# Patient Record
Sex: MALE | Race: WHITE | URBAN - METROPOLITAN AREA
[De-identification: names, ages, dates, MRNs, and addresses within clinical notes are randomized per-mention and may not be internally consistent; named-entity substitution may affect disease eponyms.]

---

## 2024-06-18 ENCOUNTER — APPOINTMENT (OUTPATIENT)
Dept: PRIMARY CARE | Facility: CLINIC | Age: 44
End: 2024-06-18

## 2024-07-02 ENCOUNTER — APPOINTMENT (OUTPATIENT)
Dept: PRIMARY CARE | Facility: CLINIC | Age: 44
End: 2024-07-02

## 2024-07-02 VITALS
DIASTOLIC BLOOD PRESSURE: 88 MMHG | TEMPERATURE: 98 F | OXYGEN SATURATION: 96 % | HEART RATE: 101 BPM | BODY MASS INDEX: 32.41 KG/M2 | WEIGHT: 218.8 LBS | SYSTOLIC BLOOD PRESSURE: 126 MMHG | HEIGHT: 69 IN | RESPIRATION RATE: 18 BRPM

## 2024-07-02 DIAGNOSIS — R73.01 IFG (IMPAIRED FASTING GLUCOSE): ICD-10-CM

## 2024-07-02 DIAGNOSIS — Z13.29 THYROID DISORDER SCREEN: ICD-10-CM

## 2024-07-02 DIAGNOSIS — E66.9 CLASS 1 OBESITY WITHOUT SERIOUS COMORBIDITY WITH BODY MASS INDEX (BMI) OF 32.0 TO 32.9 IN ADULT, UNSPECIFIED OBESITY TYPE: ICD-10-CM

## 2024-07-02 DIAGNOSIS — E78.00 ELEVATED LDL CHOLESTEROL LEVEL: Primary | ICD-10-CM

## 2024-07-02 DIAGNOSIS — H20.9 UVEITIS: ICD-10-CM

## 2024-07-02 DIAGNOSIS — M45.6 ANKYLOSING SPONDYLITIS OF LUMBAR REGION (MULTI): ICD-10-CM

## 2024-07-02 DIAGNOSIS — Z00.00 ROUTINE GENERAL MEDICAL EXAMINATION AT A HEALTH CARE FACILITY: ICD-10-CM

## 2024-07-02 DIAGNOSIS — G47.33 OBSTRUCTIVE SLEEP APNEA: ICD-10-CM

## 2024-07-02 DIAGNOSIS — Z12.11 COLON CANCER SCREENING: ICD-10-CM

## 2024-07-02 PROBLEM — M51.36 LUMBAR DEGENERATIVE DISC DISEASE: Status: ACTIVE | Noted: 2017-07-18

## 2024-07-02 PROBLEM — M51.369 LUMBAR DEGENERATIVE DISC DISEASE: Status: ACTIVE | Noted: 2017-07-18

## 2024-07-02 PROBLEM — E66.811 CLASS 1 OBESITY WITHOUT SERIOUS COMORBIDITY WITH BODY MASS INDEX (BMI) OF 32.0 TO 32.9 IN ADULT: Status: ACTIVE | Noted: 2024-07-02

## 2024-07-02 PROBLEM — F29 PSYCHOSIS (MULTI): Status: ACTIVE | Noted: 2024-03-16

## 2024-07-02 PROBLEM — Z15.89 HLA B27 (HLA B27 POSITIVE): Status: ACTIVE | Noted: 2017-07-18

## 2024-07-02 PROCEDURE — 99386 PREV VISIT NEW AGE 40-64: CPT | Performed by: NURSE PRACTITIONER

## 2024-07-02 PROCEDURE — 3008F BODY MASS INDEX DOCD: CPT | Performed by: NURSE PRACTITIONER

## 2024-07-02 RX ORDER — OLANZAPINE 10 MG/1
1 TABLET ORAL
COMMUNITY
Start: 2024-06-10

## 2024-07-02 RX ORDER — ADALIMUMAB 40MG/0.4ML
40 KIT SUBCUTANEOUS
COMMUNITY

## 2024-07-02 ASSESSMENT — ENCOUNTER SYMPTOMS
DIZZINESS: 0
SHORTNESS OF BREATH: 0
ACTIVITY CHANGE: 0
CHILLS: 0
CONFUSION: 0
APNEA: 0
WEAKNESS: 0
COUGH: 0
RESPIRATORY NEGATIVE: 1
CONSTITUTIONAL NEGATIVE: 1
ABDOMINAL PAIN: 0
HEADACHES: 0
VOMITING: 0
FEVER: 0
NAUSEA: 0
FATIGUE: 0
PALPITATIONS: 0
NERVOUS/ANXIOUS: 0

## 2024-07-02 ASSESSMENT — PATIENT HEALTH QUESTIONNAIRE - PHQ9
1. LITTLE INTEREST OR PLEASURE IN DOING THINGS: NOT AT ALL
SUM OF ALL RESPONSES TO PHQ9 QUESTIONS 1 AND 2: 0
2. FEELING DOWN, DEPRESSED OR HOPELESS: NOT AT ALL

## 2024-07-02 ASSESSMENT — ANXIETY QUESTIONNAIRES
IF YOU CHECKED OFF ANY PROBLEMS ON THIS QUESTIONNAIRE, HOW DIFFICULT HAVE THESE PROBLEMS MADE IT FOR YOU TO DO YOUR WORK, TAKE CARE OF THINGS AT HOME, OR GET ALONG WITH OTHER PEOPLE: NOT DIFFICULT AT ALL
GAD7 TOTAL SCORE: 1
7. FEELING AFRAID AS IF SOMETHING AWFUL MIGHT HAPPEN: NOT AT ALL
2. NOT BEING ABLE TO STOP OR CONTROL WORRYING: NOT AT ALL
6. BECOMING EASILY ANNOYED OR IRRITABLE: NOT AT ALL
4. TROUBLE RELAXING: NOT AT ALL
1. FEELING NERVOUS, ANXIOUS, OR ON EDGE: SEVERAL DAYS
3. WORRYING TOO MUCH ABOUT DIFFERENT THINGS: NOT AT ALL
5. BEING SO RESTLESS THAT IT IS HARD TO SIT STILL: NOT AT ALL

## 2024-07-02 NOTE — ASSESSMENT & PLAN NOTE
".A dietary \"pattern\" means generally being in the habit of eating certain types of foods while limiting others. Some people need to follow a particular dietary pattern because of their individual health needs; for example, if you have high blood pressure, your health care provider might recommend a diet low in sodium (salt).     If you are trying to improve your overall health and lower your risk of disease, establishing a healthy dietary pattern can help. This does not have to mean being extremely restrictive or eating only healthy choices. It is more of a lifestyle choice to incorporate certain dietary components while limiting others. You can find which approaches work for you, and that can help you maintain and build on a healthy eating pattern over time.     Some dietary patterns have been found to have specific health benefits. Others may have benefits for some people, for example, those with specific health conditions.     Mediterranean diet -- A Mediterranean diet is high in fruits, vegetables, beans, nuts, and seeds. It also includes olive oil as a source of healthy fat, and moderate amounts of fish, poultry, and dairy products, but little red meat. Studies have found that this type of diet lowers the risk of heart disease and stroke and helps control blood sugar in people with diabetes. It may also lower the risk of certain types of cancer.     DASH diet -- The Dietary Approaches to Stop Hypertension (DASH) diet requires a person to eat four to five servings of fruit, four to five servings of vegetables, and two to three servings of low-fat dairy per day. All foods must contain less than 25 percent total fat per serving. It can help lower blood pressure, especially when the person also reduces their salt intake. The DASH diet may also lower the risk of other health problems, including colorectal cancer, heart disease, and gout (in males).     Plant-based diets -- Plant-based diets focus on vegetables, " "fruits, grains, beans, and nuts and limit, or completely avoid, food from animals, such as meat and dairy. There are different types of plant-based diets, including:     ?Macrobiotic - This type of diet includes lots of vegetables, fruits, legumes, and seaweeds, as well as whole grains like brown rice. People who follow a macrobiotic diet might limit animal foods to white meat or fish once or twice a week.     ?Semi-vegetarian (sometimes called \"flexitarian\") - Some people choose to eat meat only occasionally, or avoid red meat but eat poultry and/or fish.     ?Lacto-ovovegetarian - This means a person eats milk and dairy products as well as eggs, but no meat.     ?Lactovegetarian - This means a person eats milk and dairy products, but not eggs or meat.     ?Vegan - This means avoiding all food products that come from animal sources, including eggs and milk products.     Plant-based diets may lower the risk of obesity, heart disease, high blood pressure, diabetes, and some cancers. While plant-based diets are generally healthy, as with other patterns, it's also important to limit things like processed foods, unhealthy fats, sugar, and salt. If you do not eat meat and/or other animal products, it is important to ensure you are getting enough nutrients overall. Vegans, in particular, have been found to be low in nutrients like calcium and vitamin B12.      Low-fat diet -- A low-fat diet involves limiting intake of calories from fat. This pattern may have some benefits when it comes to maintaining a lower weight, but overall it is likely no more effective than other approaches in helping people lose weight. In general, few, if any, other health benefits have been linked to low-fat diets.     When following a low-fat diet, it is important to focus on whole grains, legumes, fruits, and vegetables, with limited refined grains and sugar. Fats should be from healthy sources, such as fatty fish, nuts, olive oil, and canola " oil.      Low-cholesterol diet -- Cholesterol is typically found in foods with a lot of saturated fat, like red meat, butter, and cheese. A low-cholesterol diet focuses on limiting the amount of cholesterol in the diet. Since high-cholesterol foods also generally are high in unhealthy fats (such as saturated fat), limiting the cholesterol in your diet can also help reduce the amount of unhealthy fats you consume.

## 2024-07-02 NOTE — PROGRESS NOTES
Subjective   Patient ID: Tye Talamantes is a 44 y.o. male who presents for New Patient Visit.    Annual Examination     45 yo male patient here to establish care. Patient with pmh of bipolar type 1, Ankylosing spondylitis, STEVIE, PTSD  Previous patient in [facility], now changing to . Needs referral to rheumatology. Referral request to ophthalmology-uveitis     No new issues   Discussed labs 3/2024 with [facility]     Current Outpatient Medications:   ·  adalimumab (Humira,CF, Pen) 40 mg/0.4 mL pen injector kit pen-injector, Inject 1 Pen (40 mg) under the skin 1 (one) time per week., Disp: , Rfl:   ·  OLANZapine (ZyPREXA) 10 mg tablet, Take 1 tablet (10 mg) by mouth early in the morning.., Disp: , Rfl:        Health Maintenance Topics with due status: Overdue       Topic Date Due    Lipid Panel Never done    Colorectal Cancer Screening Never done    MMR Vaccines Never done    Hepatitis B Vaccines Never done    DTaP/Tdap/Td Vaccines Never done    COVID-19 Vaccine 09/01/2023     Health Maintenance Topics with due status: Not Due       Topic Last Completion Date    Influenza Vaccine 10/30/2021    Diabetes Screening 03/18/2024    Yearly Adult Physical 07/02/2024    Zoster Vaccines Not Due     Health Maintenance Topics with due status: Aged Out       Topic Date Due    HIB Vaccines Aged Out    IPV Vaccines Aged Out    Hepatitis A Vaccines Aged Out    Meningococcal Vaccine Aged Out    Rotavirus Vaccines Aged Out    HPV Vaccines Aged Out    Pneumococcal Vaccine: Pediatrics (0 to 5 Years) and At-Risk Patients (6 to 64 Years) Aged Out     Health Maintenance Topics with due status: Discontinued       Topic Date Due    HIV Screening Discontinued    Hepatitis C Screening Discontinued        Review of Systems   Constitutional: Negative.  Negative for activity change, chills, fatigue and fever.   Respiratory: Negative.  Negative for apnea, cough and shortness of breath.    Cardiovascular:  Negative for chest  "pain and palpitations.   Gastrointestinal:  Negative for abdominal pain, nausea and vomiting.   Neurological:  Negative for dizziness, weakness and headaches.   Psychiatric/Behavioral:  Negative for confusion. The patient is not nervous/anxious.        Objective   /88   Pulse 101   Temp 36.7 °C (98 °F) (Temporal)   Resp 18   Ht 1.753 m (5' 9\")   Wt 99.2 kg (218 lb 12.8 oz)   SpO2 96%   BMI 32.31 kg/m²     Physical Exam  Vitals reviewed.   Constitutional:       Appearance: Normal appearance.   Cardiovascular:      Rate and Rhythm: Normal rate and regular rhythm.      Pulses: Normal pulses.      Heart sounds: Normal heart sounds.   Pulmonary:      Effort: Pulmonary effort is normal.      Breath sounds: Normal breath sounds.   Neurological:      Mental Status: He is alert and oriented to person, place, and time.   Psychiatric:         Mood and Affect: Mood normal.         Behavior: Behavior normal.         Assessment/Plan   Problem List Items Addressed This Visit             ICD-10-CM    Obstructive sleep apnea G47.33     On cpap/ does admit to being nonadherent         Ankylosing spondylitis of lumbar region (Multi) M45.6     Referral to rheumatology   Continue humira as directed          Relevant Orders    Referral to Rheumatology    CBC and Auto Differential    Comprehensive Metabolic Panel    Uveitis H20.9     Referral to ophthalmology  No redness or inflammation noted today         Relevant Orders    Referral to Ophthalmology    Routine general medical examination at a health care facility Z00.00     PE done         Class 1 obesity without serious comorbidity with body mass index (BMI) of 32.0 to 32.9 in adult E66.9, Z68.32     .A dietary \"pattern\" means generally being in the habit of eating certain types of foods while limiting others. Some people need to follow a particular dietary pattern because of their individual health needs; for example, if you have high blood pressure, your health care " provider might recommend a diet low in sodium (salt).     If you are trying to improve your overall health and lower your risk of disease, establishing a healthy dietary pattern can help. This does not have to mean being extremely restrictive or eating only healthy choices. It is more of a lifestyle choice to incorporate certain dietary components while limiting others. You can find which approaches work for you, and that can help you maintain and build on a healthy eating pattern over time.     Some dietary patterns have been found to have specific health benefits. Others may have benefits for some people, for example, those with specific health conditions.     Mediterranean diet -- A Mediterranean diet is high in fruits, vegetables, beans, nuts, and seeds. It also includes olive oil as a source of healthy fat, and moderate amounts of fish, poultry, and dairy products, but little red meat. Studies have found that this type of diet lowers the risk of heart disease and stroke and helps control blood sugar in people with diabetes. It may also lower the risk of certain types of cancer.     DASH diet -- The Dietary Approaches to Stop Hypertension (DASH) diet requires a person to eat four to five servings of fruit, four to five servings of vegetables, and two to three servings of low-fat dairy per day. All foods must contain less than 25 percent total fat per serving. It can help lower blood pressure, especially when the person also reduces their salt intake. The DASH diet may also lower the risk of other health problems, including colorectal cancer, heart disease, and gout (in males).     Plant-based diets -- Plant-based diets focus on vegetables, fruits, grains, beans, and nuts and limit, or completely avoid, food from animals, such as meat and dairy. There are different types of plant-based diets, including:     ?Macrobiotic - This type of diet includes lots of vegetables, fruits, legumes, and seaweeds, as well as  "whole grains like brown rice. People who follow a macrobiotic diet might limit animal foods to white meat or fish once or twice a week.     ?Semi-vegetarian (sometimes called \"flexitarian\") - Some people choose to eat meat only occasionally, or avoid red meat but eat poultry and/or fish.     ?Lacto-ovovegetarian - This means a person eats milk and dairy products as well as eggs, but no meat.     ?Lactovegetarian - This means a person eats milk and dairy products, but not eggs or meat.     ?Vegan - This means avoiding all food products that come from animal sources, including eggs and milk products.     Plant-based diets may lower the risk of obesity, heart disease, high blood pressure, diabetes, and some cancers. While plant-based diets are generally healthy, as with other patterns, it's also important to limit things like processed foods, unhealthy fats, sugar, and salt. If you do not eat meat and/or other animal products, it is important to ensure you are getting enough nutrients overall. Vegans, in particular, have been found to be low in nutrients like calcium and vitamin B12.      Low-fat diet -- A low-fat diet involves limiting intake of calories from fat. This pattern may have some benefits when it comes to maintaining a lower weight, but overall it is likely no more effective than other approaches in helping people lose weight. In general, few, if any, other health benefits have been linked to low-fat diets.     When following a low-fat diet, it is important to focus on whole grains, legumes, fruits, and vegetables, with limited refined grains and sugar. Fats should be from healthy sources, such as fatty fish, nuts, olive oil, and canola oil.      Low-cholesterol diet -- Cholesterol is typically found in foods with a lot of saturated fat, like red meat, butter, and cheese. A low-cholesterol diet focuses on limiting the amount of cholesterol in the diet. Since high-cholesterol foods also generally are high in " unhealthy fats (such as saturated fat), limiting the cholesterol in your diet can also help reduce the amount of unhealthy fats you consume.          Relevant Orders    CBC and Auto Differential    Comprehensive Metabolic Panel     Other Visit Diagnoses         Codes    Elevated LDL cholesterol level    -  Primary E78.00    Relevant Orders    Lipid Panel    IFG (impaired fasting glucose)     R73.01    Relevant Orders    Hemoglobin A1C    Colon cancer screening     Z12.11    Relevant Orders    Colonoscopy Screening; Average Risk Patient    Thyroid disorder screen     Z13.29    Relevant Orders    TSH with reflex to Free T4 if abnormal

## 2024-07-02 NOTE — ASSESSMENT & PLAN NOTE
PE done   Patient brought into room from [facility], triage previously completed.  Height, Weight, Medications and Pharmacy reviewed.        Patient informed that all clinical results will be available through [employer],    Patient would like communication of all other results via:    [facility]    Cell Phone:   Telephone Information:   Mobile [phone]     Okay to leave a message containing results? Yes

## 2024-08-12 ENCOUNTER — TELEPHONE (OUTPATIENT)
Dept: GASTROENTEROLOGY | Facility: CLINIC | Age: 44
End: 2024-08-12
Payer: MEDICAID

## 2024-08-12 NOTE — TELEPHONE ENCOUNTER
----- Message from Nancy FLOOD sent at 8/12/2024 10:04 AM EDT -----  Regarding: RE: Open Access  MyChart Message sent to Patient to Call the Office  ----- Message -----  From: Jessica Stringer MA  Sent: 8/8/2024   9:49 AM EDT  To: [id number]  Subject: RE: Open Access                                  Message left for patient to callback.  ----- Message -----  From: Bisi Peralta RN  Sent: 8/6/2024  12:22 PM EDT  To: Do [id number]  Subject: Open Access                                      OA

## 2024-09-10 ENCOUNTER — APPOINTMENT (OUTPATIENT)
Dept: RHEUMATOLOGY | Facility: CLINIC | Age: 44
End: 2024-09-10
Payer: MEDICAID

## 2024-11-20 ENCOUNTER — APPOINTMENT (OUTPATIENT)
Dept: OPHTHALMOLOGY | Facility: CLINIC | Age: 44
End: 2024-11-20
Payer: MEDICAID

## 2024-12-09 ENCOUNTER — APPOINTMENT (OUTPATIENT)
Dept: OPHTHALMOLOGY | Age: 44
End: 2024-12-09
Payer: COMMERCIAL

## 2025-01-11 ENCOUNTER — HOSPITAL ENCOUNTER (EMERGENCY)
Facility: HOSPITAL | Age: 45
Discharge: OTHER INSTITUTION | End: 2025-01-11
Attending: EMERGENCY MEDICINE
Payer: COMMERCIAL

## 2025-01-11 ENCOUNTER — APPOINTMENT (OUTPATIENT)
Dept: CARDIOLOGY | Facility: HOSPITAL | Age: 45
End: 2025-01-11
Payer: COMMERCIAL

## 2025-01-11 ENCOUNTER — HOSPITAL ENCOUNTER (INPATIENT)
Facility: HOSPITAL | Age: 45
End: 2025-01-11
Payer: COMMERCIAL

## 2025-01-11 VITALS
OXYGEN SATURATION: 95 % | TEMPERATURE: 97.6 F | DIASTOLIC BLOOD PRESSURE: 78 MMHG | SYSTOLIC BLOOD PRESSURE: 131 MMHG | BODY MASS INDEX: 28.57 KG/M2 | WEIGHT: 192.9 LBS | RESPIRATION RATE: 16 BRPM | HEIGHT: 69 IN | HEART RATE: 60 BPM

## 2025-01-11 DIAGNOSIS — R45.851 SUICIDAL IDEATION: Primary | ICD-10-CM

## 2025-01-11 LAB
ALBUMIN SERPL BCP-MCNC: 4.1 G/DL (ref 3.4–5)
ALP SERPL-CCNC: 65 U/L (ref 33–120)
ALT SERPL W P-5'-P-CCNC: 14 U/L (ref 10–52)
AMPHETAMINES UR QL SCN: NORMAL
ANION GAP SERPL CALC-SCNC: 11 MMOL/L (ref 10–20)
APAP SERPL-MCNC: <10 UG/ML
AST SERPL W P-5'-P-CCNC: 12 U/L (ref 9–39)
BARBITURATES UR QL SCN: NORMAL
BASOPHILS # BLD AUTO: 0.03 X10*3/UL (ref 0–0.1)
BASOPHILS NFR BLD AUTO: 0.4 %
BENZODIAZ UR QL SCN: NORMAL
BILIRUB SERPL-MCNC: 0.6 MG/DL (ref 0–1.2)
BUN SERPL-MCNC: 6 MG/DL (ref 6–23)
BZE UR QL SCN: NORMAL
CALCIUM SERPL-MCNC: 9.3 MG/DL (ref 8.6–10.3)
CANNABINOIDS UR QL SCN: NORMAL
CHLORIDE SERPL-SCNC: 107 MMOL/L (ref 98–107)
CO2 SERPL-SCNC: 26 MMOL/L (ref 21–32)
CREAT SERPL-MCNC: 0.75 MG/DL (ref 0.5–1.3)
EGFRCR SERPLBLD CKD-EPI 2021: >90 ML/MIN/1.73M*2
EOSINOPHIL # BLD AUTO: 0.11 X10*3/UL (ref 0–0.7)
EOSINOPHIL NFR BLD AUTO: 1.4 %
ERYTHROCYTE [DISTWIDTH] IN BLOOD BY AUTOMATED COUNT: 13.2 % (ref 11.5–14.5)
ETHANOL SERPL-MCNC: <10 MG/DL
FENTANYL+NORFENTANYL UR QL SCN: NORMAL
GLUCOSE SERPL-MCNC: 92 MG/DL (ref 74–99)
HCT VFR BLD AUTO: 45.5 % (ref 41–52)
HGB BLD-MCNC: 15.6 G/DL (ref 13.5–17.5)
IMM GRANULOCYTES # BLD AUTO: 0.05 X10*3/UL (ref 0–0.7)
IMM GRANULOCYTES NFR BLD AUTO: 0.6 % (ref 0–0.9)
LYMPHOCYTES # BLD AUTO: 2.71 X10*3/UL (ref 1.2–4.8)
LYMPHOCYTES NFR BLD AUTO: 33.5 %
MCH RBC QN AUTO: 30.3 PG (ref 26–34)
MCHC RBC AUTO-ENTMCNC: 34.3 G/DL (ref 32–36)
MCV RBC AUTO: 88 FL (ref 80–100)
METHADONE UR QL SCN: NORMAL
MONOCYTES # BLD AUTO: 0.55 X10*3/UL (ref 0.1–1)
MONOCYTES NFR BLD AUTO: 6.8 %
NEUTROPHILS # BLD AUTO: 4.65 X10*3/UL (ref 1.2–7.7)
NEUTROPHILS NFR BLD AUTO: 57.3 %
NRBC BLD-RTO: 0 /100 WBCS (ref 0–0)
OPIATES UR QL SCN: NORMAL
OXYCODONE+OXYMORPHONE UR QL SCN: NORMAL
PCP UR QL SCN: NORMAL
PLATELET # BLD AUTO: 301 X10*3/UL (ref 150–450)
POTASSIUM SERPL-SCNC: 3.9 MMOL/L (ref 3.5–5.3)
PROT SERPL-MCNC: 7 G/DL (ref 6.4–8.2)
RBC # BLD AUTO: 5.15 X10*6/UL (ref 4.5–5.9)
SALICYLATES SERPL-MCNC: <3 MG/DL
SODIUM SERPL-SCNC: 140 MMOL/L (ref 136–145)
WBC # BLD AUTO: 8.1 X10*3/UL (ref 4.4–11.3)

## 2025-01-11 PROCEDURE — 2500000004 HC RX 250 GENERAL PHARMACY W/ HCPCS (ALT 636 FOR OP/ED): Performed by: EMERGENCY MEDICINE

## 2025-01-11 PROCEDURE — 90715 TDAP VACCINE 7 YRS/> IM: CPT | Performed by: EMERGENCY MEDICINE

## 2025-01-11 PROCEDURE — 99285 EMERGENCY DEPT VISIT HI MDM: CPT | Mod: 25 | Performed by: EMERGENCY MEDICINE

## 2025-01-11 PROCEDURE — 80320 DRUG SCREEN QUANTALCOHOLS: CPT | Performed by: EMERGENCY MEDICINE

## 2025-01-11 PROCEDURE — 36415 COLL VENOUS BLD VENIPUNCTURE: CPT | Performed by: EMERGENCY MEDICINE

## 2025-01-11 PROCEDURE — 80307 DRUG TEST PRSMV CHEM ANLYZR: CPT | Performed by: EMERGENCY MEDICINE

## 2025-01-11 PROCEDURE — 96372 THER/PROPH/DIAG INJ SC/IM: CPT | Performed by: STUDENT IN AN ORGANIZED HEALTH CARE EDUCATION/TRAINING PROGRAM

## 2025-01-11 PROCEDURE — 90471 IMMUNIZATION ADMIN: CPT | Performed by: EMERGENCY MEDICINE

## 2025-01-11 PROCEDURE — 85025 COMPLETE CBC W/AUTO DIFF WBC: CPT | Performed by: EMERGENCY MEDICINE

## 2025-01-11 PROCEDURE — 80053 COMPREHEN METABOLIC PANEL: CPT | Performed by: EMERGENCY MEDICINE

## 2025-01-11 PROCEDURE — 2500000004 HC RX 250 GENERAL PHARMACY W/ HCPCS (ALT 636 FOR OP/ED): Mod: JZ | Performed by: STUDENT IN AN ORGANIZED HEALTH CARE EDUCATION/TRAINING PROGRAM

## 2025-01-11 PROCEDURE — 93005 ELECTROCARDIOGRAM TRACING: CPT

## 2025-01-11 RX ORDER — OLANZAPINE 10 MG/2ML
10 INJECTION, POWDER, FOR SOLUTION INTRAMUSCULAR ONCE
Status: COMPLETED | OUTPATIENT
Start: 2025-01-11 | End: 2025-01-11

## 2025-01-11 RX ADMIN — TETANUS TOXOID, REDUCED DIPHTHERIA TOXOID AND ACELLULAR PERTUSSIS VACCINE, ADSORBED 0.5 ML: 5; 2.5; 8; 8; 2.5 SUSPENSION INTRAMUSCULAR at 04:23

## 2025-01-11 RX ADMIN — OLANZAPINE 10 MG: 10 INJECTION, POWDER, FOR SOLUTION INTRAMUSCULAR at 09:17

## 2025-01-11 SDOH — HEALTH STABILITY: MENTAL HEALTH: FOR HIGH RISK PATIENTS: 1:1 PATIENT OBSERVER AT ALL TIMES

## 2025-01-11 SDOH — HEALTH STABILITY: MENTAL HEALTH: DELUSIONS: OTHER (COMMENT)

## 2025-01-11 SDOH — HEALTH STABILITY: MENTAL HEALTH: BEHAVIORS/MOOD: CALM;COOPERATIVE

## 2025-01-11 SDOH — HEALTH STABILITY: MENTAL HEALTH: ACTIVE SUICIDAL IDEATION WITH SOME INTENT TO ACT, WITHOUT SPECIFIC PLAN (PAST 1 MONTH): YES

## 2025-01-11 SDOH — HEALTH STABILITY: MENTAL HEALTH: BEHAVIORAL HEALTH(WDL): EXCEPTIONS TO WDL

## 2025-01-11 SDOH — ECONOMIC STABILITY: HOUSING INSECURITY: FEELS SAFE LIVING IN HOME: YES

## 2025-01-11 SDOH — HEALTH STABILITY: MENTAL HEALTH: IN THE PAST FEW WEEKS, HAVE YOU WISHED YOU WERE DEAD?: YES

## 2025-01-11 SDOH — HEALTH STABILITY: MENTAL HEALTH: BEHAVIORAL HEALTH(WDL): WITHIN DEFINED LIMITS

## 2025-01-11 SDOH — HEALTH STABILITY: MENTAL HEALTH: SLEEP PATTERN: NAPS DURING THE DAY

## 2025-01-11 SDOH — HEALTH STABILITY: MENTAL HEALTH: BEHAVIORS/MOOD: SLEEPING

## 2025-01-11 SDOH — HEALTH STABILITY: MENTAL HEALTH: SLEEP PATTERN: DISTURBED/INTERRUPTED SLEEP

## 2025-01-11 SDOH — HEALTH STABILITY: MENTAL HEALTH: ACTIVE SUICIDAL IDEATION WITH SPECIFIC PLAN AND INTENT (PAST 1 MONTH): NO

## 2025-01-11 SDOH — HEALTH STABILITY: MENTAL HEALTH: HALLUCINATION: UNABLE TO ASSESS

## 2025-01-11 SDOH — HEALTH STABILITY: MENTAL HEALTH
OTHER SUICIDE PRECAUTIONS INCLUDE: PATIENT PLACED IN AN EASILY OBSERVABLE ROOM WITH DOOR/CURTAIN REMAINING OPEN;PATIENT PLACED IN GOWN (SNAPS OR PAPER GOWNS PREFERRED) AND WANDED;REMAINING RISKS IDENTIFIED AND MITIGATED;PATIENT PLACED IN PSYCH SAFE ROOM (IF AVAILABLE);PROVIDER NOTIFIED;EL

## 2025-01-11 SDOH — HEALTH STABILITY: MENTAL HEALTH: CONTENT: UNABLE TO ASSESS

## 2025-01-11 SDOH — HEALTH STABILITY: MENTAL HEALTH: IN THE PAST WEEK, HAVE YOU BEEN HAVING THOUGHTS ABOUT KILLING YOURSELF?: YES

## 2025-01-11 SDOH — HEALTH STABILITY: MENTAL HEALTH: NEEDS EXPRESSED: OTHER (COMMENT)

## 2025-01-11 SDOH — HEALTH STABILITY: MENTAL HEALTH

## 2025-01-11 SDOH — HEALTH STABILITY: MENTAL HEALTH: NON-SPECIFIC ACTIVE SUICIDAL THOUGHTS (PAST 1 MONTH): YES

## 2025-01-11 SDOH — HEALTH STABILITY: MENTAL HEALTH: CONTENT: UNREMARKABLE

## 2025-01-11 SDOH — HEALTH STABILITY: MENTAL HEALTH: WISH TO BE DEAD (PAST 1 MONTH): YES

## 2025-01-11 SDOH — HEALTH STABILITY: MENTAL HEALTH: DESCRIBE YOUR THOUGHTS OF KILLING YOURSELF RIGHT NOW:: NO PLAN HOWEVER HAVING SI THOUGHTS WITH INTENT.

## 2025-01-11 SDOH — HEALTH STABILITY: MENTAL HEALTH: IN THE PAST FEW WEEKS, HAVE YOU FELT THAT YOU OR YOUR FAMILY WOULD BE BETTER OFF IF YOU WERE DEAD?: YES

## 2025-01-11 SDOH — HEALTH STABILITY: MENTAL HEALTH: ARE YOU HAVING THOUGHTS OF KILLING YOURSELF RIGHT NOW?: YES

## 2025-01-11 SDOH — HEALTH STABILITY: MENTAL HEALTH: HAVE YOU EVER TRIED TO KILL YOURSELF?: NO

## 2025-01-11 SDOH — HEALTH STABILITY: MENTAL HEALTH: SLEEP PATTERN: RESTLESSNESS

## 2025-01-11 SDOH — HEALTH STABILITY: MENTAL HEALTH
OTHER SUICIDE PRECAUTIONS INCLUDE: PERSONAL BELONGINGS SECURED;HOURLY BEHAVIORAL ASSESSMENT PERFORMED;ELOPEMENT RISK IDENTIFIED;PROVIDER NOTIFIED;PATIENT PLACED IN PSYCH SAFE ROOM (IF AVAILABLE)

## 2025-01-11 SDOH — HEALTH STABILITY: MENTAL HEALTH: SUICIDAL BEHAVIOR (LIFETIME): NO

## 2025-01-11 SDOH — HEALTH STABILITY: MENTAL HEALTH: ARE YOU HAVING THOUGHTS OF KILLING YOURSELF RIGHT NOW?: NO

## 2025-01-11 SDOH — HEALTH STABILITY: MENTAL HEALTH
OTHER SUICIDE PRECAUTIONS INCLUDE: PATIENT PLACED IN AN EASILY OBSERVABLE ROOM WITH DOOR/CURTAIN REMAINING OPEN;PATIENT PLACED IN GOWN (SNAPS OR PAPER GOWNS PREFERRED) AND WANDED;REMAINING RISKS IDENTIFIED AND MITIGATED;PATIENT PLACED IN PSYCH SAFE ROOM (IF AVAILABLE);PROVIDER NOTIFIED;ELOPEMENT RISK IDENTIFIED;FAMILY/VISITOR ADVISED TO MAINTAIN CONTROL OF OWN PERSONAL BELONGINGS IN ROOM;FREQUENT ROUNDING WITH IRREGULAR CHECKS AT MINIMUM OF EVERY 15 MINUTES TO ASSESS PSYCH SAFETY PERFORMED;HOURLY BEHAVIORAL ASSESSMENT PERFORMED;PERSONAL BELONGINGS SECURED;VISITORS LIMITED WHEN NECESSARY AND PERSONAL ITEMS SCREENED

## 2025-01-11 SDOH — HEALTH STABILITY: MENTAL HEALTH: FOR HIGH RISK PATIENTS: ALL INTERVENTIONS ABOVE, PLUS:;1:1 PATIENT OBSERVER AT ALL TIMES

## 2025-01-11 SDOH — HEALTH STABILITY: MENTAL HEALTH
DEPRESSION SYMPTOMS: APPETITE CHANGE;CHANGE IN ENERGY LEVEL;FEELINGS OF HELPLESSNESS;FEELINGS OF HOPELESSESS;FEELINGS OF WORTHLESSNESS;INCREASED IRRITABILITY;ISOLATIVE;LOSS OF INTEREST;SLEEP DISTURBANCE

## 2025-01-11 SDOH — HEALTH STABILITY: MENTAL HEALTH: SUICIDE ASSESSMENT: ADULT (C-SSRS)

## 2025-01-11 SDOH — HEALTH STABILITY: MENTAL HEALTH: NEEDS EXPRESSED: DENIES

## 2025-01-11 SDOH — ECONOMIC STABILITY: GENERAL
FINANCIAL CONCERNS: UNABLE TO PAY BILLS;UNABLE TO OBTAIN NEEDED EQUIPMENT;UNABLE TO AFFORD MEDICATIONS;UNABLE TO MEET BASIC NEEDS

## 2025-01-11 SDOH — HEALTH STABILITY: MENTAL HEALTH: ANXIETY SYMPTOMS: PANIC ATTACK;FEELINGS OF DOOM;EXCESSIVE SWEATING;GENERALIZED

## 2025-01-11 ASSESSMENT — LIFESTYLE VARIABLES
EVER HAD A DRINK FIRST THING IN THE MORNING TO STEADY YOUR NERVES TO GET RID OF A HANGOVER: NO
HAVE YOU EVER FELT YOU SHOULD CUT DOWN ON YOUR DRINKING: NO
EVER FELT BAD OR GUILTY ABOUT YOUR DRINKING: NO
TOTAL SCORE: 0
SUBSTANCE_ABUSE_PAST_12_MONTHS: NO
PRESCIPTION_ABUSE_PAST_12_MONTHS: YES
HAVE PEOPLE ANNOYED YOU BY CRITICIZING YOUR DRINKING: NO

## 2025-01-11 ASSESSMENT — COLUMBIA-SUICIDE SEVERITY RATING SCALE - C-SSRS
6. HAVE YOU EVER DONE ANYTHING, STARTED TO DO ANYTHING, OR PREPARED TO DO ANYTHING TO END YOUR LIFE?: NO
2. HAVE YOU ACTUALLY HAD ANY THOUGHTS OF KILLING YOURSELF?: YES
5. HAVE YOU STARTED TO WORK OUT OR WORKED OUT THE DETAILS OF HOW TO KILL YOURSELF? DO YOU INTEND TO CARRY OUT THIS PLAN?: NO
4. HAVE YOU HAD THESE THOUGHTS AND HAD SOME INTENTION OF ACTING ON THEM?: YES
1. IN THE PAST MONTH, HAVE YOU WISHED YOU WERE DEAD OR WISHED YOU COULD GO TO SLEEP AND NOT WAKE UP?: YES

## 2025-01-11 ASSESSMENT — PAIN SCALES - GENERAL
PAINLEVEL_OUTOF10: 0 - NO PAIN
PAINLEVEL_OUTOF10: 0 - NO PAIN

## 2025-01-11 ASSESSMENT — PAIN - FUNCTIONAL ASSESSMENT: PAIN_FUNCTIONAL_ASSESSMENT: 0-10

## 2025-01-11 NOTE — PROGRESS NOTES
"EPAT - Social Work Psychiatric Assessment    Arrival Details  Mode of Arrival: Ambulatory  Admission Source: Emergency department  Admission Type: Voluntary  EPAT Assessment Start Date: 01/11/25  EPAT Assessment Start Time: 0805  Name of : BRI Paredes LSW    History of Present Illness  Admission Reason: Psychiatric Evaluation  HPI: Pt is a 44-year-old  white male, with a hx of Bipolar I Disorder and Substance-Related Disorder Cannabis Abuse. Charts indicate \"presents the emergency room with suicidal ideation.  The patient had taken himself off of his medications 2 months ago.  He started to have suicidal ideation for the past month.  States that stressors include having lost his job.  He does not own firearms.  He did attempt to hurt himself by cutting his wrist.  This was superficial in nature.  The patient currently does not have a clear plan to hurt himself.  Denies medical complaint such as fever, chills, cough, nausea, vomiting.\" Last psych hospitalization charted as 3/15/24-3/20/24 at [facility],  3 hospitalizations 2018 in [place] (3/16/24 note) approximately 5 total life time psych hospitalizations. Pt [facility] for [facility]ry and counseling, with the following medications outlined: nicotine polacrilex and OLANZapine orally disintegrating; discontinued HUMIRA(CF) PEN.  provider identified as [facility], has not seen provider in 2 months due to lack thereof medical. No prior SI/HI, intent or plan. Today is pt first self-harm incident. UTOX normal, BAL normal. High risk. Provider, triage, [place], and external notes reviewed.    SW Readmission Information   Readmission within 30 Days: No    Psychiatric Symptoms  Anxiety Symptoms: Panic attack, Feelings of doom, Excessive sweating, Generalized  Depression Symptoms: Appetite change, Change in energy level, Feelings of helplessness, Feelings of hopelessess, Feelings of worthlessness, " Increased irritability, Isolative, Loss of interest, Sleep disturbance  [place] Symptoms: Less need to sleep, Poor judgment    Psychosis Symptoms  Hallucination Type: Auditory, Visual  Delusion Type: No problems reported or observed.    Additional Symptoms - Adult  Generalized Anxiety Disorder: Difficult to control worry, Difficulity concentrating, Excessive anxiety/worry, Irritability, Restlessness, Sleep disturbance  Obsessive Compulsive Disorder: No problems reported or observed.  Panic Attack: Other (Comment) (Heart palpitations, racing heart.)  Post Traumatic Stress Disorder: No problems reported or observed. (Denies)  Delirium: No problems reported or observed.  Review of Symptoms Comments: Please review above.    Past Psychiatric History/Meds/Treatments  Past Psychiatric History: Last psych hospitalization charted as 3/15/24-3/20/24 at [facility],  3 hospitalizations 2018 in [place] (3/16/24 note) approximately 5 total life time psych hospitalizations. Pt referred to [facility] for psychiatry and counseling, with the following medications outlined: nicotine polacrilex and OLANZapine orally disintegrating; discontinued HUMIRA(CF) PEN. MH provider identified as [facility], has not seen provider in 2 months due to lack thereof medical. Pt report noncompliance with medication for approximately 2 months.  Past Psychiatric Meds/Treatments: Please review above.  Past Violence/Victimization History: Denies any violence/victimization.    Current Mental Health Contacts   Name/Phone Number: N/A   Last Appointment Date: N/A  Provider Name/Phone Number: [facility]/[phone]  Provider Last Appointment Date: Approximately 2 months ago.    Support System: Friends, Community (Pt report having friends who have been helping him emotionally and finacially.)    Living Arrangement: Lives alone    Home Safety  Feels Safe Living in Home: Yes (Reports home to be safe, however  does not feel safe alone with current psych status.)  Potentially Unsafe Housing Conditions: Other (Comment) (N/A)  Home Safety : Please review above.    Income Information  Employment Status for: Patient  Employment Status: Unemployed  Income Source: Unemployed (Pt report dont recieving Disability or SSI, but states that he plans to work on that next.)  Current/Previous Occupation: Unable to Assess  Income/Expense Information: Significantly in debt/bankrupt  Financial Concerns: Unable to Pay Bills, Unable to Obtain Needed Equipment, Unable to Afford Medications, Unable to Meet Basic Needs  Who Manages Finances if Patient Unable: Pt manages own finaces.  Employment/ Finance Comments: Pt report losing job during last manic episode, since has drained savings and have been recieving assistance from friends.Unable to meet basic needs.    [place] Service/Education History  Current or Previous  Service: None  Education Level: Vocational school ([employer])  History of Learning Problems: No  History of School Behavior Problems: No  School History: Please review above.    Social/Cultural History  Social History: Pt has no guardian/payee. Report current life stressors to be legal concerns regarding probation violation, living arrangements (possible eviction), finacial status, lack of MH services including medicine.  Cultural Requests During Hospitalization: Denies  Spiritual Requests During Hospitalization: Denies  Important Activities: Social, Hobbies    Legal  Criminal Activity/ Legal Involvement Pertinent to Current Situation/ Hospitalization: Reports ex-girlfriend legal restraining order against pt was violated, and since he hasnt been compliant with probation standards. Pt report violating order during last manic episode.  Legal Concerns: Please review above.  Legal Comments: Please review above.    Drug Screening  Have you used any substances (canabis, cocaine, heroin, hallucinogens, inhalants, etc.) in the past 12  months?: No  Have you used any prescription drugs other than prescribed in the past 12 months?: Yes  Is a toxicology screen needed?: Yes         Behavioral Health  Behaviors/Mood: Calm, Cooperative, Sad    Orientation  Orientation Level: Oriented X4         Thought Process  Coherency: [place] thinking  Cognition: Poor attention/concentration, Poor judgement, No short term memory loss, No long term memory loss, Impulsive, Follows commands, Appropriate attention/concentration              Violence Risk Assessment  Assessment of Violence: None noted (Unable to assess, pt did report violating retraining order however did not state any violence.)  Thoughts of Harm to Others: No    Ability to Assess Risk Screen  Risk Screen - Ability to Assess: Able to be screened  Ask Suicide-Screening Questions  1. In the past few weeks, have you wished you were dead?: Yes  2. In the past few weeks, have you felt that you or your family would be better off if you were dead?: Yes  3. In the past week, have you been having thoughts about killing yourself?: Yes  4. Have you ever tried to kill yourself?: No  5. Are you having thoughts of killing yourself right now?: Yes  Describe your thoughts of killing yourself right now:: No plan however having Si thoughts with intent.  Calculated Risk Score: Imminent Risk  [place] Suicide Severity Rating Scale (Screener/Recent Self-Report)  1. Wish to be Dead (Past 1 Month): Yes  2. Non-Specific Active Suicidal Thoughts (Past 1 Month): Yes  3. Active Suicidal Ideation with any Methods (Not Plan) Without Intent to Act (Past 1 Month): Yes  4. Active Suicidal Ideation with Some Intent to Act, Without Specific Plan (Past 1 Month): Yes  5. Active Suicidal Ideation with Specific Plan and Intent (Past 1 Month): No  6. Suicidal Behavior (Lifetime): No  Calculated C-SSRS Risk Score (Lifetime/Recent): High Risk  Step 1: Risk Factors  Current & Past Psychiatric Dx: Mood disorder, Psychotic disorder  Presenting  "Symptoms: Anxiety and/or panic, Hopelessness or despair, Psychosis  Family History:  (Unable to assess.)  Precipitants/Stressors: Triggering events leading to humiliation, shame, and/or despair (e.g. loss of relationship, financial or health status) (real or anticipated), Legal problems, Social isolation, Perceived burden on others, Pending incarceration or homelessness  Change in Treatment: Non-compliant or not receiving treatment  Access to Lethal Methods : No  Step 2: Protective Factors   Protective Factors External: Supportive social network or family or friends  Step 3: Suicidal Ideation Intensity  Most Severe Suicidal Ideation Identified: Pt report most sever being today due to self harm behavior associated with SI/intent. Pt report feelings of not being worthy, and not wanting to be bothered.\"  How Many Times Have You Had These Thoughts: Once a week  When You Have the Thoughts How Long do They Last : 1-4 hours/a lot of the time  Could/Can You Stop Thinking About Killing Yourself or Wanting to Die if You Want to: Can control thoughts with a lot of difficulty  Are There Things - Anyone or Anything - That Stopped You From Wanting to Die or Acting on: Deterrents most likely did not stop you  What Sort of Reasons Did You Have For Thinking About Wanting to Die or Killing Yourself: Mostly to end or stop the pain (you couldn't go on living with the pain or how you were feeling)  Total Score: 17  Step 5: Documentation  Risk Level: Moderate suicide risk    Psychiatric Impression and Plan of Care  Assessment and Plan: Upon assessment pt was observed sitting cooperative, interactive, sad AOx4. Pt report his is at the \"end of the road. I have been off my meds, and have been feeling down and depressed.\" Pt brought himself in after ongoing hallucinations, due to 2 months noncompliance with MH provider/medicine. Pt denies commands, gun access, HI, intent or plan. Pt report prior to noncompliance being connected with " "[facility], however disconnected because he did not have medical. He discloses losing his job and violating restraining order during last manic episode. Since, legal concerns have arisen due to noncompliance of probation following the order violation. Pt does not currently have the means to provide for his basic needs, and at risk of possible eviction. He has been surviving through savings and friend's financial donations. Pt resides alone, and states his depressed moods along with hallucinations have increased. Pt report increasing isolating self, as his moods/hallucinations increase. Py reports weight/muscles loss associated with lack of eating because of mood. Energy is reported as \"down,\" with some panic attacks. Pt is unable to identify a reason for living, stating \"I don't know, I don't have an answer for that.\" Pt reports some concern surround assistance in completing paperwork for disability or ssi, as he states he qualifies.     Diagnosis  Bipolar I Disorder    Recommendation:  EPAT recommendation for admission, discussed and reviewed with William Lopez MD. Pt identified as imminent risk for SI, high risk for [place].  Specific Resources Provided to Patient: 211, recommended for admission. Discharge planning from psych facilitty will provide more resources.  CM Notified: N/A  PHP/IOP Recommended: N/A  Specific Information Provided for PHP/IOP: N/A  Plan Comments: Please review above.    Outcome/Disposition  Patient's Perception of Outcome Achieved: Would like assistance with stabilizing psychosis/hallucinations.  Assessment, Recommendations and Risk Level Reviewed with: William Lopez MD  EPAT Assessment Completed Date: 01/11/25  EPAT Assessment Completed Time: 0958      "

## 2025-01-11 NOTE — PROGRESS NOTES
Emergency Medicine Transition of Care Note.    I received Tye Talamantes in signout from Dr. Greene.  Please see the previous ED provider note for all HPI, PE and MDM up to the time of signout at 0700. This is in addition to the primary record.    In brief Tye Talamantes is an 44 y.o. male presenting for   Chief Complaint   Patient presents with    Suicidal     Hx anxiety and depression, and Bipolar- has been off meds for a few months now. Ran out of insurance. Has a superficial lac to left wrist     At the time of signout we were awaiting: EPAT evaluation    ED Course as of 01/11/25 1336   Sat Jan 11, 2025   0403 Patient twelve-lead EKG interpreted by myself shows sinus rhythm, sugar is 67, left axis deviation, normal [place] interval, normal QRS duration, normal QT, no STEMI. [WJ]      ED Course User Index  [WJ] Glenn Greene, DO       Medical Decision Making  Patient was signed out to me pending EPAT evaluation.  EPAT evaluated the patient and are recommending admission.  Patient was accepted at [facility].  Transfer order was placed.  I was informed by [facility] however that they require a pink slip for the patient.  At this time the patient is voluntarily seeking medical attention for his depression and suicidal ideation.  He has been deemed appropriate for inpatient psychiatric stabilization and treatment, however is a voluntary patient.  At this time I do not believe that a pink slip is warranted as the patient is willing to go to a psychiatric facility.  [facility] is not willing to take the patient unless he is an involuntary admission with a pink slip, and given the patient is willingly seeking help and willing to sign a voluntary admission, I feel that violating the patient's civil liberties is inappropriate at this time by placing a pink slip.  Alternative placement was obtained for the patient by the emergency psychiatric assessment team.  [facility].  EMTALA form was updated.    Final diagnoses:   None            Procedure  Procedures    William Lopez MD

## 2025-01-11 NOTE — ED PROVIDER NOTES
HPI   Chief Complaint   Patient presents with    Suicidal     Hx anxiety and depression, and Bipolar- has been off meds for a few months now. Ran out of insurance. Has a superficial lac to left wrist       HPI  HISTORY OF PRESENT ILLNESS:  44-year-old male with history of bipolar disorder presents the emergency room with suicidal ideation.  The patient had taken himself off of his medications 2 months ago.  He started to have suicidal ideation for the past month.  States that stressors include having lost his job.  He does not own firearms.  He did attempt to hurt himself by cutting his wrist.  This was superficial in nature.  The patient currently does not have a clear plan to hurt himself.  Denies medical complaint such as fever, chills, cough, nausea, vomiting    Past Medical History: Bipolar disorder  Past Surgical History: Denied  Family History: family history not pertinent to presenting problem or chief complaint  Social History: Cigarette smoking    __________________________________________________________  PHYSICAL EXAM:    Appearance: Alert, oriented , cooperative   Skin: Superficial less than 1 cm lacerations/abrasion to left wrist.  Intact,  dry skin, no lesions, rash, petechiae or purpura.   Eyes: PERRLA, EOMs intact,  Conjunctiva pink with no redness or exudates.    HENT: Normocephalic, atraumatic. Nares patent   Neck: Supple. Trachea at midline.   Pulmonary: Lung sounds are clear bilaterally.  There is no rales, rhonchi, or wheezing.  Cardiac: Regular rate and rhythm, no rubs, murmurs, or gallops. No JVD,   Abdomen: Abdomen is soft, nontender, and nondistended.  No palpable organomegaly.  No rebound or guarding.  No CVA tenderness. Nonsurgical abdomen  Genitourinary: Exam deferred.  Musculoskeletal: no edema, pain, cyanosis, or deformity in extremities. Pulses full and equal.   Neurological:  Cranial nerves are grossly intact, grossly normal sensation, no weakness, no focal findings  identified.    __________________________________________________________  MEDICAL DECISION MAKING:    Patient presented voluntarily to the emergency department with suicidal ideations and feeling depressed.  He has not made a clear plan.  Did cut himself superficially on the wrist.  He does not require repairs.  Will obtain labs For medical clearance.  Patient medical labs were overall unremarkable.  All labs were within normal limits.  Patient at this point is medically cleared.  Consulted EPAT.  At 0700 hrs., patient care signed out to the the daytime physician pending EPAT evaluation and recommendations.      Chronic Medical Conditions Significantly Affecting Care: Obstructive sleep apnea, ankylosing spondylitis, uveitis, bipolar disorder     External Records Reviewed: I reviewed recent and relevant outside records including: Patient primary care note from July 2, 20    Patient twelve-lead EKG interpreted by myself shows sinus rhythm, rate 67, normal [place] interval, left axis deviation, normal QRS duration, normal QT, no STEMI.    Glenn Greene  Emergency Medicine      Patient History   Past Medical History:   Diagnosis Date    Ankylosing spondylitis with multisystem involvement (Multi)     Bipolar 1 disorder, depressed, full remission (Multi)     STEVIE (generalized anxiety disorder)     [id number] positive     Lumbar degenerative disc disease     ZACH (obstructive sleep apnea)     PTSD (post-traumatic stress disorder)      Past Surgical History:   Procedure Laterality Date    NO PAST SURGERIES       Family History   Problem Relation Name Age of Onset    COPD Mother      Cancer Mother      Diabetes Father       Social History     Tobacco Use    Smoking status: Former     Average packs/day: 0.5 packs/day for 17.0 years (8.5 ttl pk-yrs)     Types: Cigarettes     Start date: 1999    Smokeless tobacco: Current     Types: Chew   Substance Use Topics    Alcohol use: Not Currently    Drug use: Not Currently       Physical Exam    ED Triage Vitals [01/11/25 0305]   Temperature Heart Rate Respirations BP   36.4 °C (97.5 °F) 84 16 (!) 150/102      Pulse Ox Temp Source Heart Rate Source Patient Position   98 % Temporal Monitor --      BP Location FiO2 (%)     -- --       Physical Exam      ED Course & [facility]   ED Course as of 01/11/25 0602   Sat Jan 11, 2025   0403 Patient twelve-lead EKG interpreted by myself shows sinus rhythm, sugar is 67, left axis deviation, normal [place] interval, normal QRS duration, normal QT, no STEMI. [WJ]      ED Course User Index  [WJ] Glenn Greene,                  No data recorded     [place] Coma Scale Score: 15 (01/11/25 0305 : Marsha Bah RN)                           Medical Decision Making      Procedure  Procedures     Glenn Greene,   01/11/25 0604       Glenn Greene DO  01/11/25 0725

## 2025-01-13 LAB
ATRIAL RATE: 67 BPM
P AXIS: 38 DEGREES
PR INTERVAL: 168 MS
Q ONSET: 251 MS
QRS COUNT: 11 BEATS
QRS DURATION: 102 MS
QT INTERVAL: 422 MS
QTC CALCULATION(BAZETT): 446 MS
QTC FREDERICIA: 438 MS
R AXIS: -62 DEGREES
T AXIS: 47 DEGREES
T OFFSET: 462 MS
VENTRICULAR RATE: 67 BPM

## 2025-03-20 ENCOUNTER — APPOINTMENT (OUTPATIENT)
Dept: PRIMARY CARE | Facility: CLINIC | Age: 45
End: 2025-03-20
Payer: MEDICAID